# Patient Record
Sex: MALE | Race: BLACK OR AFRICAN AMERICAN | NOT HISPANIC OR LATINO | URBAN - METROPOLITAN AREA
[De-identification: names, ages, dates, MRNs, and addresses within clinical notes are randomized per-mention and may not be internally consistent; named-entity substitution may affect disease eponyms.]

---

## 2023-06-26 ENCOUNTER — OFFICE VISIT (OUTPATIENT)
Dept: URBAN - METROPOLITAN AREA CLINIC 25 | Facility: CLINIC | Age: 58
End: 2023-06-26

## 2023-07-24 ENCOUNTER — OFFICE VISIT (OUTPATIENT)
Dept: URBAN - METROPOLITAN AREA CLINIC 25 | Facility: CLINIC | Age: 58
End: 2023-07-24

## 2024-11-12 NOTE — HPI-TODAY'S VISIT:
June 2023 visit: Patient was last seen in January 2020 for a colonoscopy given history of colon polyps.  A 5 mm transverse colon adenomatous polyp was removed, few scattered diverticula noted in the entire colon.  Repeat colonoscopy recommended in 3 to 5 years agree